# Patient Record
Sex: MALE | Race: WHITE
[De-identification: names, ages, dates, MRNs, and addresses within clinical notes are randomized per-mention and may not be internally consistent; named-entity substitution may affect disease eponyms.]

---
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
## 2021-04-27 ENCOUNTER — HOSPITAL ENCOUNTER (INPATIENT)
Dept: HOSPITAL 47 - EC | Age: 21
LOS: 2 days | Discharge: HOME | DRG: 339 | End: 2021-04-29
Attending: SURGERY | Admitting: SURGERY
Payer: COMMERCIAL

## 2021-04-27 ENCOUNTER — HOSPITAL ENCOUNTER (OUTPATIENT)
Dept: HOSPITAL 47 - LABWHC1 | Age: 21
Discharge: HOME | End: 2021-04-27
Attending: NURSE PRACTITIONER
Payer: COMMERCIAL

## 2021-04-27 ENCOUNTER — HOSPITAL ENCOUNTER (OUTPATIENT)
Dept: HOSPITAL 47 - RADCTMAIN | Age: 21
Discharge: HOME | End: 2021-04-27
Attending: NURSE PRACTITIONER
Payer: COMMERCIAL

## 2021-04-27 DIAGNOSIS — Z88.0: ICD-10-CM

## 2021-04-27 DIAGNOSIS — K35.32: Primary | ICD-10-CM

## 2021-04-27 DIAGNOSIS — E87.2: ICD-10-CM

## 2021-04-27 DIAGNOSIS — R10.31: Primary | ICD-10-CM

## 2021-04-27 DIAGNOSIS — Z20.822: ICD-10-CM

## 2021-04-27 LAB
ALBUMIN SERPL-MCNC: 4.1 G/DL (ref 3.5–5)
ALP SERPL-CCNC: 50 U/L (ref 38–126)
ALT SERPL-CCNC: 25 U/L (ref 4–49)
AMYLASE SERPL-CCNC: 52 U/L (ref 30–110)
ANION GAP SERPL CALC-SCNC: 12 MMOL/L
APTT BLD: 24.4 SEC (ref 22–30)
AST SERPL-CCNC: 22 U/L (ref 17–59)
BASOPHILS # BLD AUTO: 0.1 K/UL (ref 0–0.2)
BASOPHILS NFR BLD AUTO: 1 %
BUN SERPL-SCNC: 9 MG/DL (ref 9–20)
CALCIUM SPEC-MCNC: 9.5 MG/DL (ref 8.4–10.2)
CHLORIDE SERPL-SCNC: 99 MMOL/L (ref 98–107)
CO2 SERPL-SCNC: 22 MMOL/L (ref 22–30)
EOSINOPHIL # BLD AUTO: 0.4 K/UL (ref 0–0.7)
EOSINOPHIL NFR BLD AUTO: 2 %
ERYTHROCYTE [DISTWIDTH] IN BLOOD BY AUTOMATED COUNT: 4.96 M/UL (ref 4.3–5.9)
ERYTHROCYTE [DISTWIDTH] IN BLOOD: 12.5 % (ref 11.5–15.5)
GLUCOSE SERPL-MCNC: 130 MG/DL (ref 74–99)
HCT VFR BLD AUTO: 40.2 % (ref 39–53)
HGB BLD-MCNC: 14 GM/DL (ref 13–17.5)
INR PPP: 1.1 (ref ?–1.2)
LIPASE SERPL-CCNC: 69 U/L (ref 23–300)
LYMPHOCYTES # SPEC AUTO: 2.7 K/UL (ref 1–4.8)
LYMPHOCYTES NFR SPEC AUTO: 13 %
MCH RBC QN AUTO: 28.3 PG (ref 25–35)
MCHC RBC AUTO-ENTMCNC: 34.8 G/DL (ref 31–37)
MCV RBC AUTO: 81.1 FL (ref 80–100)
MONOCYTES # BLD AUTO: 0.9 K/UL (ref 0–1)
MONOCYTES NFR BLD AUTO: 4 %
NEUTROPHILS # BLD AUTO: 16.8 K/UL (ref 1.3–7.7)
NEUTROPHILS NFR BLD AUTO: 80 %
PLATELET # BLD AUTO: 356 K/UL (ref 150–450)
POTASSIUM SERPL-SCNC: 4.3 MMOL/L (ref 3.5–5.1)
PROT SERPL-MCNC: 7.2 G/DL (ref 6.3–8.2)
PT BLD: 11.7 SEC (ref 9–12)
SODIUM SERPL-SCNC: 133 MMOL/L (ref 137–145)
WBC # BLD AUTO: 21.1 K/UL (ref 4–11)

## 2021-04-27 PROCEDURE — 83690 ASSAY OF LIPASE: CPT

## 2021-04-27 PROCEDURE — 88304 TISSUE EXAM BY PATHOLOGIST: CPT

## 2021-04-27 PROCEDURE — 80048 BASIC METABOLIC PNL TOTAL CA: CPT

## 2021-04-27 PROCEDURE — 99285 EMERGENCY DEPT VISIT HI MDM: CPT

## 2021-04-27 PROCEDURE — 80053 COMPREHEN METABOLIC PANEL: CPT

## 2021-04-27 PROCEDURE — 82150 ASSAY OF AMYLASE: CPT

## 2021-04-27 PROCEDURE — 36415 COLL VENOUS BLD VENIPUNCTURE: CPT

## 2021-04-27 PROCEDURE — 87636 SARSCOV2 & INF A&B AMP PRB: CPT

## 2021-04-27 PROCEDURE — 85730 THROMBOPLASTIN TIME PARTIAL: CPT

## 2021-04-27 PROCEDURE — 81003 URINALYSIS AUTO W/O SCOPE: CPT

## 2021-04-27 PROCEDURE — 85610 PROTHROMBIN TIME: CPT

## 2021-04-27 PROCEDURE — 85025 COMPLETE CBC W/AUTO DIFF WBC: CPT

## 2021-04-27 PROCEDURE — 96374 THER/PROPH/DIAG INJ IV PUSH: CPT

## 2021-04-27 PROCEDURE — 83605 ASSAY OF LACTIC ACID: CPT

## 2021-04-27 PROCEDURE — 96375 TX/PRO/DX INJ NEW DRUG ADDON: CPT

## 2021-04-27 PROCEDURE — 74176 CT ABD & PELVIS W/O CONTRAST: CPT

## 2021-04-27 PROCEDURE — 87040 BLOOD CULTURE FOR BACTERIA: CPT

## 2021-04-27 RX ADMIN — CEFAZOLIN SCH MLS/HR: 330 INJECTION, POWDER, FOR SOLUTION INTRAMUSCULAR; INTRAVENOUS at 23:21

## 2021-04-27 RX ADMIN — MORPHINE SULFATE PRN MG: 4 INJECTION, SOLUTION INTRAMUSCULAR; INTRAVENOUS at 22:14

## 2021-04-27 NOTE — ED
General Adult HPI





- General


Chief complaint: Abdominal Pain


Stated complaint: abd pain


Time Seen by Provider: 04/27/21 19:30


Source: patient


Mode of arrival: ambulatory


Limitations: no limitations





- History of Present Illness


Initial comments: 





20-year-old male with a past medical history tonsillectomy presents to the 

emergency room for appendicitis.  Patient reports that yesterday he started to 

have abdominal pain.  States today the pain was worse.  States this was 

throughout his lower abdomen.  Patient states that he went to urgent care and 

had an outpatient CAT scan done.  He got a call that it was positive for 

appendicitis.  Patient unsure if he has had fevers.  No nausea vomiting.  No 

anorexia.  Patient has no other complaints at this time including shortness of 

breath, chest pain, nausea or vomiting, headache, or visual changes.





- Related Data


                                Home Medications











 Medication  Instructions  Recorded  Confirmed


 


Acetaminophen [Tylenol] 500 mg PO Q4-6H PRN 04/27/21 04/27/21


 


Venlafaxine HCl ER [Effexor Xr] 37.5 mg PO DAILY 04/27/21 04/27/21











                                    Allergies











Allergy/AdvReac Type Severity Reaction Status Date / Time


 


ibuprofen [From Motrin] Allergy  Swelling Verified 04/27/21 20:36


 


Penicillins Allergy  Rash/Hives Verified 04/27/21 20:36














Review of Systems


ROS Statement: 


Those systems with pertinent positive or pertinent negative responses have been 

documented in the HPI.





ROS Other: All systems not noted in ROS Statement are negative.





Past Medical History


Past Medical History: No Reported History


History of Any Multi-Drug Resistant Organisms: None Reported


Past Surgical History: Tonsillectomy


Past Psychological History: No Psychological Hx Reported


Smoking Status: Never smoker


Past Alcohol Use History: None Reported


Past Drug Use History: None Reported





General Exam


Limitations: no limitations


General appearance: alert, in no apparent distress


Head exam: Present: atraumatic, normocephalic, normal inspection


Eye exam: Present: normal appearance, PERRL, EOMI.  Absent: scleral icterus, 

conjunctival injection, periorbital swelling


ENT exam: Present: normal exam, mucous membranes moist


Neck exam: Present: normal inspection.  Absent: tenderness, meningismus, 

lymphadenopathy


Respiratory exam: Present: normal lung sounds bilaterally.  Absent: respiratory 

distress, wheezes, rales, rhonchi, stridor


Cardiovascular Exam: Present: regular rate, normal rhythm, normal heart sounds. 

Absent: systolic murmur, diastolic murmur, rubs, gallop, clicks


GI/Abdominal exam: Present: soft, tenderness (tenderness noted to generalized 

lower abdomen.), normal bowel sounds.  Absent: distended, guarding, rebound, 

rigid





Course


                                   Vital Signs











  04/27/21





  18:57


 


Temperature 99.0 F


 


Pulse Rate 118 H


 


Respiratory 18





Rate 


 


Blood Pressure 116/82


 


O2 Sat by Pulse 97





Oximetry 














Medical Decision Making





- Medical Decision Making





Patient has an outpatient CAT scan demonstrating acute appendicitis.  There is 

moderate fat stranding in the lower mid abdomen extending to the right side that

appears to be involving the tip of the cecum him and is consistent with acute 

appendicitis and phlegmon.  CBC does show leukocytosis of 21.  CMP unremarkable.

 He does have mild epigastric acidosis of 2.1.





Patient was given 30 mL/kg gram based on ideal body weight of 75.2 kg.  

Discussed case and antibiotic therapy with Dr Nguyen - He was started on 

Levaquin and Flagyl which was chosen as he has a penicillin ALLERGY.





patient admitted





- Lab Data


Result diagrams: 


                                 04/27/21 19:16





                                 04/27/21 19:16


                                   Lab Results











  04/27/21 04/27/21 04/27/21 Range/Units





  19:16 19:16 19:16 


 


WBC  21.1 H    (4.0-11.0)  k/uL


 


RBC  4.96    (4.30-5.90)  m/uL


 


Hgb  14.0    (13.0-17.5)  gm/dL


 


Hct  40.2    (39.0-53.0)  %


 


MCV  81.1    (80.0-100.0)  fL


 


MCH  28.3    (25.0-35.0)  pg


 


MCHC  34.8    (31.0-37.0)  g/dL


 


RDW  12.5    (11.5-15.5)  %


 


Plt Count  356    (150-450)  k/uL


 


MPV  7.7    


 


Neutrophils %  80    %


 


Lymphocytes %  13    %


 


Monocytes %  4    %


 


Eosinophils %  2    %


 


Basophils %  1    %


 


Neutrophils #  16.8 H    (1.3-7.7)  k/uL


 


Lymphocytes #  2.7    (1.0-4.8)  k/uL


 


Monocytes #  0.9    (0-1.0)  k/uL


 


Eosinophils #  0.4    (0-0.7)  k/uL


 


Basophils #  0.1    (0-0.2)  k/uL


 


PT   11.7   (9.0-12.0)  sec


 


INR   1.1   (<1.2)  


 


APTT   24.4   (22.0-30.0)  sec


 


Sodium    133 L  (137-145)  mmol/L


 


Potassium    4.3  (3.5-5.1)  mmol/L


 


Chloride    99  ()  mmol/L


 


Carbon Dioxide    22  (22-30)  mmol/L


 


Anion Gap    12  mmol/L


 


BUN    9  (9-20)  mg/dL


 


Creatinine    0.70  (0.66-1.25)  mg/dL


 


Est GFR (CKD-EPI)AfAm    >90  (>60 ml/min/1.73 sqM)  


 


Est GFR (CKD-EPI)NonAf    >90  (>60 ml/min/1.73 sqM)  


 


Glucose    130 H  (74-99)  mg/dL


 


Plasma Lactic Acid Waqar     (0.7-2.0)  mmol/L


 


Calcium    9.5  (8.4-10.2)  mg/dL


 


Total Bilirubin    0.9  (0.2-1.3)  mg/dL


 


AST    22  (17-59)  U/L


 


ALT    25  (4-49)  U/L


 


Alkaline Phosphatase    50  ()  U/L


 


Total Protein    7.2  (6.3-8.2)  g/dL


 


Albumin    4.1  (3.5-5.0)  g/dL


 


Amylase    52  ()  U/L


 


Lipase    69  ()  U/L














  04/27/21 Range/Units





  19:16 


 


WBC   (4.0-11.0)  k/uL


 


RBC   (4.30-5.90)  m/uL


 


Hgb   (13.0-17.5)  gm/dL


 


Hct   (39.0-53.0)  %


 


MCV   (80.0-100.0)  fL


 


MCH   (25.0-35.0)  pg


 


MCHC   (31.0-37.0)  g/dL


 


RDW   (11.5-15.5)  %


 


Plt Count   (150-450)  k/uL


 


MPV   


 


Neutrophils %   %


 


Lymphocytes %   %


 


Monocytes %   %


 


Eosinophils %   %


 


Basophils %   %


 


Neutrophils #   (1.3-7.7)  k/uL


 


Lymphocytes #   (1.0-4.8)  k/uL


 


Monocytes #   (0-1.0)  k/uL


 


Eosinophils #   (0-0.7)  k/uL


 


Basophils #   (0-0.2)  k/uL


 


PT   (9.0-12.0)  sec


 


INR   (<1.2)  


 


APTT   (22.0-30.0)  sec


 


Sodium   (137-145)  mmol/L


 


Potassium   (3.5-5.1)  mmol/L


 


Chloride   ()  mmol/L


 


Carbon Dioxide   (22-30)  mmol/L


 


Anion Gap   mmol/L


 


BUN   (9-20)  mg/dL


 


Creatinine   (0.66-1.25)  mg/dL


 


Est GFR (CKD-EPI)AfAm   (>60 ml/min/1.73 sqM)  


 


Est GFR (CKD-EPI)NonAf   (>60 ml/min/1.73 sqM)  


 


Glucose   (74-99)  mg/dL


 


Plasma Lactic Acid Waqar  2.1 H*  (0.7-2.0)  mmol/L


 


Calcium   (8.4-10.2)  mg/dL


 


Total Bilirubin   (0.2-1.3)  mg/dL


 


AST   (17-59)  U/L


 


ALT   (4-49)  U/L


 


Alkaline Phosphatase   ()  U/L


 


Total Protein   (6.3-8.2)  g/dL


 


Albumin   (3.5-5.0)  g/dL


 


Amylase   ()  U/L


 


Lipase   ()  U/L














Disposition


Clinical Impression: 


 Appendicitis





Disposition: ADMITTED AS IP TO THIS HOSP


Is patient prescribed a controlled substance at d/c from ED?: No


Referrals: 


Gonsalo Black DO [Primary Care Provider] - 1-2 days


Time of Disposition: 20:41

## 2021-04-27 NOTE — CT
EXAMINATION TYPE: CT abdomen pelvis wo con

 

DATE OF EXAM: 4/27/2021

 

COMPARISON: None

 

HISTORY: RLQ pain

 

CT DLP: 1246 mGycm

Automated exposure control for dose reduction was used.

 

Images obtained from the diaphragm to the floor the pelvis with no contrast.

 

Lung bases are clear. There is no pleural effusion. Heart size is normal. There is no pericardial eff
usion. Liver spleen stomach pancreas gallbladder appear intact. Bile ducts are not dilated.

 

There is no adrenal mass. Kidneys have normal size and contour. There is no hydronephrosis. There is 
no retroperitoneal adenopathy.

 

Bladder distends smoothly. There is no inguinal hernia. There is no evidence of a pelvic mass. There 
is no free fluid in the pelvis.

 

There is moderate fat stranding in the lower abdomen on the right side. The terminal ileum appears no
rmal. Appendix is difficult to identify in the fat stranding present at the tip of the cecum. The sig
moid colon appears normal.

 

Lumbar vertebra have normal alignment. Disc spaces are fairly normal. There is no compression fractur
e. The bony pelvis is intact. Hip joints are intact.

 

IMPRESSION:

Moderate fat stranding in the lower mid abdomen extending to the right side. This appears to be invol
ving the tip of the cecum in the anticipated region of the appendix and is consistent with acute appe
ndicitis and phlegmon. Terminal ileum is collapsed without evidence of inflammation. Appendix not spe
cifically identified. Inflammatory bowel disease involving small bowel not excluded.

## 2021-04-28 LAB
ALBUMIN SERPL-MCNC: 4.5 G/DL (ref 3.8–4.9)
ALBUMIN/GLOB SERPL: 1.88 G/DL (ref 1.6–3.17)
ALP SERPL-CCNC: 51 U/L (ref 41–126)
ALT SERPL-CCNC: 26 U/L (ref 10–49)
ANION GAP SERPL CALC-SCNC: 15.1 MMOL/L (ref 4–12)
AST SERPL-CCNC: 14 U/L (ref 14–35)
BASOPHILS # BLD AUTO: 0.02 X 10*3/UL (ref 0–0.1)
BASOPHILS NFR BLD AUTO: 0.1 %
BUN SERPL-SCNC: 11 MG/DL (ref 9–27)
BUN/CREAT SERPL: 12.22 RATIO (ref 12–20)
CALCIUM SPEC-MCNC: 9.8 MG/DL (ref 8.7–10.3)
CHLORIDE SERPL-SCNC: 100 MMOL/L (ref 96–109)
CO2 SERPL-SCNC: 21.9 MMOL/L (ref 21.6–31.8)
EOSINOPHIL # BLD AUTO: 0.05 X 10*3/UL (ref 0.04–0.35)
EOSINOPHIL NFR BLD AUTO: 0.2 %
ERYTHROCYTE [DISTWIDTH] IN BLOOD BY AUTOMATED COUNT: 5.01 X 10*6/UL (ref 4.4–5.6)
ERYTHROCYTE [DISTWIDTH] IN BLOOD: 12.6 % (ref 11.5–14.5)
GLOBULIN SER CALC-MCNC: 2.4 G/DL (ref 1.6–3.3)
GLUCOSE SERPL-MCNC: 98 MG/DL (ref 70–110)
HCT VFR BLD AUTO: 42.8 % (ref 39.6–50)
HGB BLD-MCNC: 13.6 G/DL (ref 13–17)
LYMPHOCYTES # SPEC AUTO: 2.97 X 10*3/UL (ref 0.9–5)
LYMPHOCYTES NFR SPEC AUTO: 14.3 %
MCH RBC QN AUTO: 27.1 PG (ref 27–32)
MCHC RBC AUTO-ENTMCNC: 31.8 G/DL (ref 32–37)
MCV RBC AUTO: 85.4 FL (ref 80–97)
MONOCYTES # BLD AUTO: 1.51 X 10*3/UL (ref 0.2–1)
MONOCYTES NFR BLD AUTO: 7.3 %
NEUTROPHILS # BLD AUTO: 16.07 X 10*3/UL (ref 1.8–7.7)
NEUTROPHILS NFR BLD AUTO: 77.5 %
PH UR: 5.5 [PH] (ref 5–8)
PLATELET # BLD AUTO: 405 X 10*3/UL (ref 140–440)
POTASSIUM SERPL-SCNC: 4.7 MMOL/L (ref 3.5–5.5)
PROT SERPL-MCNC: 6.9 G/DL (ref 6.2–8.2)
SODIUM SERPL-SCNC: 137 MMOL/L (ref 135–145)
SP GR UR: 1.01 (ref 1–1.03)
UROBILINOGEN UR QL STRIP: <2 MG/DL (ref ?–2)
WBC # BLD AUTO: 20.75 X 10*3/UL (ref 4.5–10)

## 2021-04-28 PROCEDURE — 0DTJ4ZZ RESECTION OF APPENDIX, PERCUTANEOUS ENDOSCOPIC APPROACH: ICD-10-PCS

## 2021-04-28 RX ADMIN — CEFAZOLIN SCH MLS/HR: 330 INJECTION, POWDER, FOR SOLUTION INTRAMUSCULAR; INTRAVENOUS at 11:39

## 2021-04-28 RX ADMIN — ACETAMINOPHEN PRN MG: 325 TABLET, FILM COATED ORAL at 11:38

## 2021-04-28 RX ADMIN — CEFAZOLIN SCH MLS/HR: 330 INJECTION, POWDER, FOR SOLUTION INTRAMUSCULAR; INTRAVENOUS at 07:26

## 2021-04-28 RX ADMIN — METRONIDAZOLE SCH MLS/HR: 500 INJECTION, SOLUTION INTRAVENOUS at 20:09

## 2021-04-28 RX ADMIN — HYDROMORPHONE HYDROCHLORIDE PRN MG: 1 INJECTION, SOLUTION INTRAMUSCULAR; INTRAVENOUS; SUBCUTANEOUS at 11:39

## 2021-04-28 RX ADMIN — CEFAZOLIN SCH: 330 INJECTION, POWDER, FOR SOLUTION INTRAMUSCULAR; INTRAVENOUS at 05:17

## 2021-04-28 RX ADMIN — HYDROMORPHONE HYDROCHLORIDE PRN MG: 1 INJECTION, SOLUTION INTRAMUSCULAR; INTRAVENOUS; SUBCUTANEOUS at 14:46

## 2021-04-28 RX ADMIN — METRONIDAZOLE SCH MLS/HR: 500 INJECTION, SOLUTION INTRAVENOUS at 11:43

## 2021-04-28 RX ADMIN — HYDROCODONE BITARTRATE AND ACETAMINOPHEN PRN EACH: 5; 325 TABLET ORAL at 17:17

## 2021-04-28 RX ADMIN — MORPHINE SULFATE PRN MG: 4 INJECTION, SOLUTION INTRAMUSCULAR; INTRAVENOUS at 02:23

## 2021-04-28 RX ADMIN — ACETAMINOPHEN PRN MG: 325 TABLET, FILM COATED ORAL at 17:16

## 2021-04-28 NOTE — P.GSHP
History of Present Illness


H&P Date: 04/28/21


Chief Complaint: Acute appendicitis





This a 20-year-old male who then into the emergency room last night with 

complaints of abdominal pain.  Patient workup found have evidence of acute 

appendicitis.





Past Medical History


Past Medical History: No Reported History


History of Any Multi-Drug Resistant Organisms: None Reported


Past Surgical History: Tonsillectomy


Past Anesthesia/Blood Transfusion Reactions: No Reported Reaction


Past Psychological History: No Psychological Hx Reported


Smoking Status: Never smoker


Past Alcohol Use History: None Reported


Past Drug Use History: None Reported





Medications and Allergies


                                Home Medications











 Medication  Instructions  Recorded  Confirmed  Type


 


Acetaminophen [Tylenol] 500 mg PO Q4-6H PRN 04/27/21 04/27/21 History


 


Venlafaxine HCl ER [Effexor Xr] 37.5 mg PO DAILY 04/27/21 04/27/21 History








                                    Allergies











Allergy/AdvReac Type Severity Reaction Status Date / Time


 


ibuprofen [From Motrin] Allergy  Swelling Verified 04/27/21 20:36


 


Penicillins Allergy  Rash/Hives Verified 04/27/21 20:36














Surgical - Exam


                                   Vital Signs











Temp Pulse Resp BP Pulse Ox


 


 99.0 F   118 H  18   116/82   97 


 


 04/27/21 18:57  04/27/21 18:57  04/27/21 18:57  04/27/21 18:57  04/27/21 18:57














- General


well developed, well nourished, no distress





- Eyes


PERRL





- ENT


normal pinna





- Neck


no masses





- Respiratory


normal expansion





- Cardiovascular


Rhythm: regular





- Abdomen





Report right lower quadrant tenderness


Abdomen: soft





Results





- Labs





                                 04/27/21 19:16





                                 04/27/21 19:16


                  Abnormal Lab Results - Last 24 Hours (Table)











  04/27/21 04/27/21 04/27/21 Range/Units





  19:16 19:16 19:16 


 


WBC  21.1 H    (4.0-11.0)  k/uL


 


Neutrophils #  16.8 H    (1.3-7.7)  k/uL


 


Sodium   133 L   (137-145)  mmol/L


 


Glucose   130 H   (74-99)  mg/dL


 


Plasma Lactic Acid Waqar    2.1 H*  (0.7-2.0)  mmol/L














  04/27/21 Range/Units





  22:42 


 


WBC   (4.0-11.0)  k/uL


 


Neutrophils #   (1.3-7.7)  k/uL


 


Sodium   (137-145)  mmol/L


 


Glucose   (74-99)  mg/dL


 


Plasma Lactic Acid Waqar  0.6 L  (0.7-2.0)  mmol/L








                                 Diabetes panel











  04/27/21 Range/Units





  19:16 


 


Sodium  133 L  (137-145)  mmol/L


 


Potassium  4.3  (3.5-5.1)  mmol/L


 


Chloride  99  ()  mmol/L


 


Carbon Dioxide  22  (22-30)  mmol/L


 


BUN  9  (9-20)  mg/dL


 


Creatinine  0.70  (0.66-1.25)  mg/dL


 


Glucose  130 H  (74-99)  mg/dL


 


Calcium  9.5  (8.4-10.2)  mg/dL


 


AST  22  (17-59)  U/L


 


ALT  25  (4-49)  U/L


 


Alkaline Phosphatase  50  ()  U/L


 


Total Protein  7.2  (6.3-8.2)  g/dL


 


Albumin  4.1  (3.5-5.0)  g/dL








                                  Calcium panel











  04/27/21 Range/Units





  19:16 


 


Calcium  9.5  (8.4-10.2)  mg/dL


 


Albumin  4.1  (3.5-5.0)  g/dL








                                 Pituitary panel











  04/27/21 Range/Units





  19:16 


 


Sodium  133 L  (137-145)  mmol/L


 


Potassium  4.3  (3.5-5.1)  mmol/L


 


Chloride  99  ()  mmol/L


 


Carbon Dioxide  22  (22-30)  mmol/L


 


BUN  9  (9-20)  mg/dL


 


Creatinine  0.70  (0.66-1.25)  mg/dL


 


Glucose  130 H  (74-99)  mg/dL


 


Calcium  9.5  (8.4-10.2)  mg/dL








                                  Adrenal panel











  04/27/21 Range/Units





  19:16 


 


Sodium  133 L  (137-145)  mmol/L


 


Potassium  4.3  (3.5-5.1)  mmol/L


 


Chloride  99  ()  mmol/L


 


Carbon Dioxide  22  (22-30)  mmol/L


 


BUN  9  (9-20)  mg/dL


 


Creatinine  0.70  (0.66-1.25)  mg/dL


 


Glucose  130 H  (74-99)  mg/dL


 


Calcium  9.5  (8.4-10.2)  mg/dL


 


Total Bilirubin  0.9  (0.2-1.3)  mg/dL


 


AST  22  (17-59)  U/L


 


ALT  25  (4-49)  U/L


 


Alkaline Phosphatase  50  ()  U/L


 


Total Protein  7.2  (6.3-8.2)  g/dL


 


Albumin  4.1  (3.5-5.0)  g/dL














- Imaging


CT scan - pelvis: report reviewed (Indications acute appendicitis)





Assessment and Plan


Assessment: 





Acute appendicitis.  We'll perform laparoscopic appendectomy

## 2021-04-28 NOTE — CONS
CONSULTATION



DATE OF SERVICE:

04/28/2021



REASON FOR CONSULTATION:

Perforated appendicitis.



HISTORY OF PRESENT ILLNESS:

The patient is a 20-year-old  male who presented to [facility] ER last

evening for evaluation of abdominal pain that started a day before presentation to the

hospital.  The patient was describing the pain to be more recently in the right lower

abdominal area, describing the pain to be sharp, and that is has progressively gotten

worse since it started, intensity almost 9/10 and no radiation.  The patient has felt

nauseated but no vomiting and no diarrhea or constipation.  The patient apparently was

evaluated in Urgent Care, an outside facility, where the patient had a CT of abdomen

and pelvis ordered with evidence of appendicitis.  The patient subsequently was told to

go to the ER. The patient was admitted to the hospital and was taken to the OR this

morning with evidence of acute appendicitis, status post laparoscopic appendectomy.

The patient does have PENICILLIN ALLERGY.  Infectious Disease was consulted for

management of antibiotic therapy.



REVIEW OF SYSTEMS:

Positive points have been mentioned in the HPI.  Rest of the systems are negative.



PAST MEDICAL HISTORY:

No major illnesses.



PAST SURGICAL HISTORY:

Tonsillectomy.



SOCIAL HISTORY:

Denies smoking, drinking or drug use.



FAMILY HISTORY:

No pertinent findings noticed.



ALLERGIES:

PENICILLIN with a rash, anaphylaxis. Also ALLERGIC TO IBUPROFEN.



MEDICATIONS:

The patient is currently on Tylenol, Norco, Lovenox, Dilaudid, morphine, Narcan and IV

fluid.



PHYSICAL EXAMINATION:

Blood pressure 123/75, pulse of 95, temperature 97.6. He is 95% on room air.

General description is a young male lying in bed in no distress.  No tachypnea or

accessory muscles of respiration use.

HEENT:  Examination shows no pallor or scleral icterus.  Oral mucous membrane is dry.

NECK: Trachea is central. No thyromegaly.

LUNGS: Unlabored breathing. Clear to auscultation anteriorly. No wheeze or crackle.

HEART: S1, S2.  Regular rate and rhythm.

ABDOMEN:  Soft. Mildly tender. No guarding or rigidity.



EXTREMITIES: No edema of the feet.

SKIN EXAMINATION: No rash or mass palpable.

Neurologically the patient is awake, alert, oriented x3. Mood and affect normal.



LABS:

Hemoglobin is 14, white count 21.1 with a left shift.  BUN of 9, creatinine 0.70.

Electrolytes have been normal.  Urine is negative.  Corona and influenza PCR was

negative.



DIAGNOSTIC IMPRESSION AND PLAN:

1. Patient with admission to hospital with abdominal pain in this patient who has been

    diagnosed with acute appendicitis, status post laparoscopic appendectomy.  Per

    discussion with the surgeon, there was possible perforation and will need to cover

    for the enteric Gram-negative, both aerobes and anaerobes.

2. Patient with PENICILLIN ALLERGY. That will limit the number of antibiotics safe to

    use.



PLAN:

1. Rocephin 2 grams IV piggyback daily.

2. Flagyl 500 mg IV every 8 hours.

3. Gentle IV fluid.

4. Will follow his clinical condition to further adjust medication if needed. Thank

    you for this consultation. Will follow this patient along with you.





MMODL / IJN: [id number] / Job#: [id number]

## 2021-04-29 VITALS
DIASTOLIC BLOOD PRESSURE: 76 MMHG | TEMPERATURE: 98.1 F | RESPIRATION RATE: 18 BRPM | HEART RATE: 82 BPM | SYSTOLIC BLOOD PRESSURE: 130 MMHG

## 2021-04-29 LAB
ANION GAP SERPL CALC-SCNC: 4 MMOL/L
BASOPHILS # BLD AUTO: 0 K/UL (ref 0–0.2)
BASOPHILS NFR BLD AUTO: 0 %
BUN SERPL-SCNC: 8 MG/DL (ref 9–20)
CALCIUM SPEC-MCNC: 9 MG/DL (ref 8.4–10.2)
CHLORIDE SERPL-SCNC: 105 MMOL/L (ref 98–107)
CO2 SERPL-SCNC: 30 MMOL/L (ref 22–30)
EOSINOPHIL # BLD AUTO: 0.2 K/UL (ref 0–0.7)
EOSINOPHIL NFR BLD AUTO: 2 %
ERYTHROCYTE [DISTWIDTH] IN BLOOD BY AUTOMATED COUNT: 4.62 M/UL (ref 4.3–5.9)
ERYTHROCYTE [DISTWIDTH] IN BLOOD: 13 % (ref 11.5–15.5)
GLUCOSE SERPL-MCNC: 99 MG/DL (ref 74–99)
HCT VFR BLD AUTO: 38.9 % (ref 39–53)
HGB BLD-MCNC: 12.2 GM/DL (ref 13–17.5)
LYMPHOCYTES # SPEC AUTO: 2.2 K/UL (ref 1–4.8)
LYMPHOCYTES NFR SPEC AUTO: 21 %
MCH RBC QN AUTO: 26.5 PG (ref 25–35)
MCHC RBC AUTO-ENTMCNC: 31.4 G/DL (ref 31–37)
MCV RBC AUTO: 84.2 FL (ref 80–100)
MONOCYTES # BLD AUTO: 0.7 K/UL (ref 0–1)
MONOCYTES NFR BLD AUTO: 7 %
NEUTROPHILS # BLD AUTO: 7.1 K/UL (ref 1.3–7.7)
NEUTROPHILS NFR BLD AUTO: 69 %
PLATELET # BLD AUTO: 317 K/UL (ref 150–450)
POTASSIUM SERPL-SCNC: 4.4 MMOL/L (ref 3.5–5.1)
SODIUM SERPL-SCNC: 139 MMOL/L (ref 137–145)
WBC # BLD AUTO: 10.3 K/UL (ref 4–11)

## 2021-04-29 RX ADMIN — HYDROCODONE BITARTRATE AND ACETAMINOPHEN PRN EACH: 5; 325 TABLET ORAL at 11:59

## 2021-04-29 RX ADMIN — CEFAZOLIN SCH: 330 INJECTION, POWDER, FOR SOLUTION INTRAMUSCULAR; INTRAVENOUS at 04:18

## 2021-04-29 RX ADMIN — METRONIDAZOLE SCH MLS/HR: 500 INJECTION, SOLUTION INTRAVENOUS at 03:20

## 2021-04-29 RX ADMIN — HYDROCODONE BITARTRATE AND ACETAMINOPHEN PRN EACH: 5; 325 TABLET ORAL at 06:05

## 2021-04-29 RX ADMIN — HYDROCODONE BITARTRATE AND ACETAMINOPHEN PRN EACH: 5; 325 TABLET ORAL at 00:26

## 2021-04-29 RX ADMIN — ACETAMINOPHEN PRN MG: 325 TABLET, FILM COATED ORAL at 14:26

## 2021-04-29 RX ADMIN — METRONIDAZOLE SCH MLS/HR: 500 INJECTION, SOLUTION INTRAVENOUS at 12:00

## 2021-04-29 NOTE — PN
PROGRESS NOTE



DATE OF SERVICE:

04/29/2021



REASON FOR FOLLOWUP:

Appendicitis.



INTERVAL HISTORY:

The patient is currently afebrile. The patient is breathing comfortably, has been

complaining of some abdominal pain.  No nausea, no vomiting.  Has been passing gas.

Denies any bowel movement.  No chest pain, shortness of breath or cough.



PHYSICAL EXAMINATION:

Blood pressure 130/76, pulse of 82, temperature 98.1. He is 98% on room air.

General description is a middle-aged male lying in bed in no distress.

RESPIRATORY SYSTEM: Unlabored breathing, clear to auscultation anteriorly.

HEART: S1, S2.  Regular rate and rhythm.

ABDOMEN:  Soft, no guarding, no rigidity.  No organomegaly.

EXTREMITIES: No edema of feet.



LABS:

Hemoglobin is 12.2, white count normalized to 10.3, BUN of 8, creatinine 0.70.



DIAGNOSTIC IMPRESSION AND PLAN:

Patient with appendicitis in this patient seems to have shown overall clinical

improvement on Rocephin and Flagyl to continue, transition therapy with oral

antibiotics once stable.  Continue supportive care.





MMODL / IJN: [id number] / Job#: [id number]

## 2021-04-29 NOTE — P.DS
Providers


Date of admission: 


04/29/21 10:27





Expected date of discharge: 04/29/21


Attending physician: 


Charles Nguyen





Consults: 





                                        





04/28/21 09:58


Consult Physician Routine 


   Consulting Provider: Alejandro Bruno


   Consult Reason/Comments: Acute appendicitis


   Do you want consulting provider notified?: Yes











Primary care physician: 


Gonsalo Black





Hospital Course: 





Discharge diagnosis


1.  Acute appendicitis with possible perforation status post laparoscopic 

appendectomy





Hospital course


This a 20-year-old male who then into the emergency room with complaints of 

abdominal pain.  Patient workup found have evidence of acute appendicitis.  

Patient is status post laparoscopic appendectomy.  He tolerated surgery well.  

His pain is controlled.  He is tolerating diet.  He is having flatus.  He is up 

and ambulating.  His white count has normalized.  He is afebrile.  He is stable 

for discharge.  Please refer to chart for any further details.





Physician Assistant note has been reviewed by physician. Signing provider agrees

with the documented findings, assessment, and plan of care. 





Patient Condition at Discharge: Stable





Plan - Discharge Summary


Discharge Rx Participant: No


New Discharge Prescriptions: 


New


   HYDROcodone/APAP 5-325MG [Norco 5-325] 1 tab PO Q6HR PRN 3 Days #12 tab


     PRN Reason: Pain


   metroNIDAZOLE [Flagyl] 500 mg PO Q8HR #21 tab


   Levofloxacin [Levaquin] 500 mg PO DAILY 7 Days #7 tab





Continue


   Venlafaxine HCl ER [Effexor XR] 37.5 mg PO DAILY





Discontinued


   Acetaminophen [Tylenol] 500 mg PO Q4-6H PRN


     PRN Reason: Pain


Discharge Medication List





Venlafaxine HCl ER [Effexor XR] 37.5 mg PO DAILY 04/27/21 [History]


HYDROcodone/APAP 5-325MG [Norco 5-325] 1 tab PO Q6HR PRN 3 Days #12 tab 04/29/21

[Rx]


Levofloxacin [Levaquin] 500 mg PO DAILY 7 Days #7 tab 04/29/21 [Rx]


metroNIDAZOLE [Flagyl] 500 mg PO Q8HR #21 tab 04/29/21 [Rx]








Follow up Appointment(s)/Referral(s): 


Gonsalo Black DO [Primary Care Provider] - 1-2 days


Charles Nguyen MD [STAFF PHYSICIAN] - 1 Week


Activity/Diet/Wound Care/Special Instructions: 


No driving while taking Norco


No lifting over 10 pounds


You may shower. No soaking or tub baths for 2 weeks


Very light activity until you are reevaluated at your follow up appointment with

your surgeon


Discharge Disposition: HOME SELF-CARE